# Patient Record
Sex: MALE | Race: WHITE | Employment: OTHER | URBAN - METROPOLITAN AREA
[De-identification: names, ages, dates, MRNs, and addresses within clinical notes are randomized per-mention and may not be internally consistent; named-entity substitution may affect disease eponyms.]

---

## 2021-01-21 ENCOUNTER — APPOINTMENT (OUTPATIENT)
Dept: CT IMAGING | Facility: CLINIC | Age: 81
End: 2021-01-21
Attending: EMERGENCY MEDICINE
Payer: MEDICARE

## 2021-01-21 ENCOUNTER — HOSPITAL ENCOUNTER (EMERGENCY)
Facility: CLINIC | Age: 81
Discharge: ANOTHER HOSPITAL | End: 2021-01-21
Attending: EMERGENCY MEDICINE | Admitting: EMERGENCY MEDICINE
Payer: MEDICARE

## 2021-01-21 ENCOUNTER — HOSPITAL ENCOUNTER (INPATIENT)
Facility: CLINIC | Age: 81
LOS: 2 days | Discharge: HOME | DRG: 065 | End: 2021-01-23
Attending: INTERNAL MEDICINE | Admitting: HOSPITALIST
Payer: MEDICARE

## 2021-01-21 VITALS
OXYGEN SATURATION: 98 % | WEIGHT: 170 LBS | HEART RATE: 70 BPM | SYSTOLIC BLOOD PRESSURE: 132 MMHG | BODY MASS INDEX: 25.76 KG/M2 | TEMPERATURE: 97.5 F | HEIGHT: 68 IN | RESPIRATION RATE: 15 BRPM | DIASTOLIC BLOOD PRESSURE: 70 MMHG

## 2021-01-21 DIAGNOSIS — I61.9 INTRAPARENCHYMAL HEMORRHAGE OF BRAIN (H): ICD-10-CM

## 2021-01-21 DIAGNOSIS — I10 BENIGN ESSENTIAL HYPERTENSION: Primary | ICD-10-CM

## 2021-01-21 DIAGNOSIS — I61.0 NONTRAUMATIC SUBCORTICAL HEMORRHAGE OF LEFT CEREBRAL HEMISPHERE (H): ICD-10-CM

## 2021-01-21 PROBLEM — I63.9 STROKE (H): Status: ACTIVE | Noted: 2021-01-21

## 2021-01-21 LAB
ANION GAP SERPL CALCULATED.3IONS-SCNC: 5 MMOL/L (ref 3–14)
APTT PPP: 24 SEC (ref 22–37)
BASOPHILS # BLD AUTO: 0 10E9/L (ref 0–0.2)
BASOPHILS NFR BLD AUTO: 0.5 %
BUN SERPL-MCNC: 16 MG/DL (ref 7–30)
CALCIUM SERPL-MCNC: 9.1 MG/DL (ref 8.5–10.1)
CHLORIDE SERPL-SCNC: 106 MMOL/L (ref 94–109)
CO2 SERPL-SCNC: 25 MMOL/L (ref 20–32)
CREAT SERPL-MCNC: 1.07 MG/DL (ref 0.66–1.25)
DIFFERENTIAL METHOD BLD: NORMAL
EOSINOPHIL # BLD AUTO: 0.1 10E9/L (ref 0–0.7)
EOSINOPHIL NFR BLD AUTO: 1.1 %
ERYTHROCYTE [DISTWIDTH] IN BLOOD BY AUTOMATED COUNT: 13.5 % (ref 10–15)
GFR SERPL CREATININE-BSD FRML MDRD: 64 ML/MIN/{1.73_M2}
GLUCOSE SERPL-MCNC: 92 MG/DL (ref 70–99)
HCT VFR BLD AUTO: 45.2 % (ref 40–53)
HGB BLD-MCNC: 15.1 G/DL (ref 13.3–17.7)
IMM GRANULOCYTES # BLD: 0 10E9/L (ref 0–0.4)
IMM GRANULOCYTES NFR BLD: 0.4 %
INR PPP: 1.04 (ref 0.86–1.14)
LABORATORY COMMENT REPORT: NORMAL
LYMPHOCYTES # BLD AUTO: 1 10E9/L (ref 0.8–5.3)
LYMPHOCYTES NFR BLD AUTO: 17.5 %
MCH RBC QN AUTO: 31.5 PG (ref 26.5–33)
MCHC RBC AUTO-ENTMCNC: 33.4 G/DL (ref 31.5–36.5)
MCV RBC AUTO: 94 FL (ref 78–100)
MONOCYTES # BLD AUTO: 0.4 10E9/L (ref 0–1.3)
MONOCYTES NFR BLD AUTO: 7.4 %
NEUTROPHILS # BLD AUTO: 4 10E9/L (ref 1.6–8.3)
NEUTROPHILS NFR BLD AUTO: 73.1 %
NRBC # BLD AUTO: 0 10*3/UL
NRBC BLD AUTO-RTO: 0 /100
PLATELET # BLD AUTO: 194 10E9/L (ref 150–450)
POTASSIUM SERPL-SCNC: 3.9 MMOL/L (ref 3.4–5.3)
RBC # BLD AUTO: 4.8 10E12/L (ref 4.4–5.9)
SARS-COV-2 RNA RESP QL NAA+PROBE: NEGATIVE
SODIUM SERPL-SCNC: 136 MMOL/L (ref 133–144)
SPECIMEN SOURCE: NORMAL
TROPONIN I SERPL-MCNC: <0.015 UG/L (ref 0–0.04)
WBC # BLD AUTO: 5.5 10E9/L (ref 4–11)

## 2021-01-21 PROCEDURE — 120N000001 HC R&B MED SURG/OB

## 2021-01-21 PROCEDURE — 84484 ASSAY OF TROPONIN QUANT: CPT | Performed by: EMERGENCY MEDICINE

## 2021-01-21 PROCEDURE — 80048 BASIC METABOLIC PNL TOTAL CA: CPT | Performed by: EMERGENCY MEDICINE

## 2021-01-21 PROCEDURE — 85730 THROMBOPLASTIN TIME PARTIAL: CPT | Performed by: EMERGENCY MEDICINE

## 2021-01-21 PROCEDURE — 96376 TX/PRO/DX INJ SAME DRUG ADON: CPT | Mod: 59

## 2021-01-21 PROCEDURE — 99285 EMERGENCY DEPT VISIT HI MDM: CPT | Mod: 25

## 2021-01-21 PROCEDURE — 99223 1ST HOSP IP/OBS HIGH 75: CPT | Mod: AI | Performed by: HOSPITALIST

## 2021-01-21 PROCEDURE — 85025 COMPLETE CBC W/AUTO DIFF WBC: CPT | Performed by: EMERGENCY MEDICINE

## 2021-01-21 PROCEDURE — 250N000009 HC RX 250: Performed by: EMERGENCY MEDICINE

## 2021-01-21 PROCEDURE — 70496 CT ANGIOGRAPHY HEAD: CPT

## 2021-01-21 PROCEDURE — C9803 HOPD COVID-19 SPEC COLLECT: HCPCS

## 2021-01-21 PROCEDURE — 87635 SARS-COV-2 COVID-19 AMP PRB: CPT | Performed by: EMERGENCY MEDICINE

## 2021-01-21 PROCEDURE — 70450 CT HEAD/BRAIN W/O DYE: CPT

## 2021-01-21 PROCEDURE — 85610 PROTHROMBIN TIME: CPT | Performed by: EMERGENCY MEDICINE

## 2021-01-21 PROCEDURE — 96374 THER/PROPH/DIAG INJ IV PUSH: CPT | Mod: 59

## 2021-01-21 PROCEDURE — 250N000011 HC RX IP 250 OP 636: Performed by: EMERGENCY MEDICINE

## 2021-01-21 PROCEDURE — 0042T CT HEAD PERFUSION WITH CONTRAST: CPT | Mod: MG

## 2021-01-21 PROCEDURE — 93005 ELECTROCARDIOGRAM TRACING: CPT

## 2021-01-21 RX ORDER — BISACODYL 10 MG
10 SUPPOSITORY, RECTAL RECTAL DAILY PRN
Status: DISCONTINUED | OUTPATIENT
Start: 2021-01-21 | End: 2021-01-23 | Stop reason: HOSPADM

## 2021-01-21 RX ORDER — ACETAMINOPHEN 325 MG/1
650 TABLET ORAL EVERY 4 HOURS PRN
Status: DISCONTINUED | OUTPATIENT
Start: 2021-01-21 | End: 2021-01-23 | Stop reason: HOSPADM

## 2021-01-21 RX ORDER — AMOXICILLIN 250 MG
2 CAPSULE ORAL 2 TIMES DAILY PRN
Status: DISCONTINUED | OUTPATIENT
Start: 2021-01-21 | End: 2021-01-23 | Stop reason: HOSPADM

## 2021-01-21 RX ORDER — LIDOCAINE 40 MG/G
CREAM TOPICAL
Status: DISCONTINUED | OUTPATIENT
Start: 2021-01-21 | End: 2021-01-23 | Stop reason: HOSPADM

## 2021-01-21 RX ORDER — IOPAMIDOL 755 MG/ML
500 INJECTION, SOLUTION INTRAVASCULAR ONCE
Status: COMPLETED | OUTPATIENT
Start: 2021-01-21 | End: 2021-01-21

## 2021-01-21 RX ORDER — AMOXICILLIN 250 MG
1 CAPSULE ORAL 2 TIMES DAILY PRN
Status: DISCONTINUED | OUTPATIENT
Start: 2021-01-21 | End: 2021-01-23 | Stop reason: HOSPADM

## 2021-01-21 RX ORDER — MULTIVIT-MIN/IRON/FOLIC ACID/K 18-600-40
25 CAPSULE ORAL DAILY
COMMUNITY

## 2021-01-21 RX ORDER — HYDRALAZINE HYDROCHLORIDE 20 MG/ML
10-20 INJECTION INTRAMUSCULAR; INTRAVENOUS
Status: DISCONTINUED | OUTPATIENT
Start: 2021-01-21 | End: 2021-01-23 | Stop reason: HOSPADM

## 2021-01-21 RX ORDER — POLYETHYLENE GLYCOL 3350 17 G/17G
17 POWDER, FOR SOLUTION ORAL DAILY PRN
Status: DISCONTINUED | OUTPATIENT
Start: 2021-01-21 | End: 2021-01-23 | Stop reason: HOSPADM

## 2021-01-21 RX ORDER — LABETALOL HYDROCHLORIDE 5 MG/ML
40 INJECTION, SOLUTION INTRAVENOUS ONCE
Status: COMPLETED | OUTPATIENT
Start: 2021-01-21 | End: 2021-01-21

## 2021-01-21 RX ORDER — LANOLIN ALCOHOL/MO/W.PET/CERES
3 CREAM (GRAM) TOPICAL
Status: DISCONTINUED | OUTPATIENT
Start: 2021-01-21 | End: 2021-01-23 | Stop reason: HOSPADM

## 2021-01-21 RX ORDER — ACETAMINOPHEN 650 MG/1
650 SUPPOSITORY RECTAL EVERY 4 HOURS PRN
Status: DISCONTINUED | OUTPATIENT
Start: 2021-01-21 | End: 2021-01-23 | Stop reason: HOSPADM

## 2021-01-21 RX ORDER — LISINOPRIL 20 MG/1
10 TABLET ORAL DAILY
Status: ON HOLD | COMMUNITY
End: 2021-01-23

## 2021-01-21 RX ORDER — LABETALOL HYDROCHLORIDE 5 MG/ML
20 INJECTION, SOLUTION INTRAVENOUS ONCE
Status: COMPLETED | OUTPATIENT
Start: 2021-01-21 | End: 2021-01-21

## 2021-01-21 RX ORDER — ONDANSETRON 4 MG/1
4 TABLET, ORALLY DISINTEGRATING ORAL EVERY 6 HOURS PRN
Status: DISCONTINUED | OUTPATIENT
Start: 2021-01-21 | End: 2021-01-23 | Stop reason: HOSPADM

## 2021-01-21 RX ORDER — LABETALOL HYDROCHLORIDE 5 MG/ML
10-20 INJECTION, SOLUTION INTRAVENOUS EVERY 10 MIN PRN
Status: DISCONTINUED | OUTPATIENT
Start: 2021-01-21 | End: 2021-01-23 | Stop reason: HOSPADM

## 2021-01-21 RX ORDER — ONDANSETRON 2 MG/ML
4 INJECTION INTRAMUSCULAR; INTRAVENOUS EVERY 6 HOURS PRN
Status: DISCONTINUED | OUTPATIENT
Start: 2021-01-21 | End: 2021-01-23 | Stop reason: HOSPADM

## 2021-01-21 RX ADMIN — IOPAMIDOL 120 ML: 755 INJECTION, SOLUTION INTRAVENOUS at 17:49

## 2021-01-21 RX ADMIN — LABETALOL HYDROCHLORIDE 20 MG: 5 INJECTION, SOLUTION INTRAVENOUS at 20:15

## 2021-01-21 RX ADMIN — LABETALOL HYDROCHLORIDE 20 MG: 5 INJECTION, SOLUTION INTRAVENOUS at 21:36

## 2021-01-21 RX ADMIN — SODIUM CHLORIDE 95 ML: 9 INJECTION, SOLUTION INTRAVENOUS at 17:49

## 2021-01-21 RX ADMIN — LABETALOL HYDROCHLORIDE 20 MG: 5 INJECTION, SOLUTION INTRAVENOUS at 18:54

## 2021-01-21 ASSESSMENT — ENCOUNTER SYMPTOMS
WEAKNESS: 1
FACIAL ASYMMETRY: 0
NUMBNESS: 1
SPEECH DIFFICULTY: 0
HEADACHES: 0

## 2021-01-21 ASSESSMENT — MIFFLIN-ST. JEOR: SCORE: 1455.61

## 2021-01-21 NOTE — CONSULTS
[facility]    Stroke Telephone Note    I was called by Dr. Malgorzata Paez on 01/21/21 at 1740 regarding patient Shaheen Farrell. The patient is a 80 year old male with history of HTN who presenting to the emergency department for evaluation for new onset right upper and lower extremity weakness/numbness. 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 He recalls onset of symptoms approximately at 4:20 PM.  In the emergency department patient reports some improvement of symptoms. On exam in the emergency department patient was noted to have incoordination in RUE.  CT scan revealed an area of hyperdensity in the left basal ganglia.     Stroke Code Data  (for stroke code without tele)  Stroke code activated 01/21/21   1740   First stroke provider response 01/21/21   1742   Last known normal 01/21/21   1500   Time of discovery   (or onset of symptoms) 01/21/21   1620   Head CT read by me 01/21/21   1749   Was stroke code de-escalated?   01/21/21 1814          TPA Treatment   Not given due to active bleeding.    Endovascular Treatment  Not initiated due to absence of proximal vessel occlusion    Impression  80-year-old male who presents to the emergency department for evaluation of new onset right upper extremity and right lower extremity weakness/numbness.  CT scan revealed a presumed hypertensive hemorrhage versus hemorrhagic transformation of an ischemic infarct in the left basal ganglia    Recommendations  - BP goal < 140/90  - Repeat CT as stability scan in 6 hours  - MRI brain w/wo when able  - Transfer to [facility] for further medical management   - No AC/AP    My recommendations are based on the information provided over the phone by Shaheen WELLS Jami's in-person providers. They are not intended to replace the clinical judgment of his in-person providers. I was not requested to personally see or  "examine the patient at this time.    Alicia Gomez PA-C   Neurology  To page me or covering stroke neurology team member, click here: [facility]COM   Choose \"On Call\" tab at top, then search dropdown box for \"Neurology Adult\", select location, press Enter, then look for stroke/neuro ICU/telestroke.           "

## 2021-01-21 NOTE — ED PROVIDER NOTES
"  History   Chief Complaint:  Right-Sided Weakness      The history is provided by the patient.      Shaheen Farrell is an 80 year old male with history of hypertension who presents via EMS for evaluation of right-sided weakness. 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 During the podcast he was holding his cell phone up to his head with his right elbow flexed. After approximately 78 minutes, around 1620 (1 hour prior to arrival), his right arm started to feel weak and he had some tingling in his right fingertips. He then tried to stand up but he noticed that he had also developed new weakness and \"heaviness\" in his right leg such that he could not get up. Due to concern that his symptoms could be representative of a stroke, the patient had his wife call EMS to bring him into the ED for evaluation. By the time EMS arrived, he was able to stand and walk and his symptoms have been gradually improving over time, citing they are \"90% better\".  His blood sugar was 103.     He describes persistent tingling or numbness in the tip of his right thumb and index finger and feeling his right arm is uncoordinated compared to his baseline. He denies headache, visual disturbance, facial asymmetry, or difficulty speaking. He has otherwise been in his baseline state of health.      Review of Systems   Constitutional: Negative for activity change.   Eyes: Negative for visual disturbance.   Musculoskeletal: Positive for gait problem (now improved).   Neurological: Positive for weakness (right arm and leg) and numbness (right fingertips). Negative for facial asymmetry, speech difficulty and headaches.   All other systems reviewed and are negative.    Allergies:  No known drug allergies     Medications:  Lisinopril   Vitamin D3     Past Medical History:    Hypertension     Social History:  The patient presents to the ED via EMS.   The patient is .   The patient is a retired  " "and former marine.     Physical Exam     Patient Vitals for the past 24 hrs:   BP Temp Temp src Pulse Resp SpO2 Height Weight   01/21/21 2148 132/70 -- -- 70 15 98 % -- --   01/21/21 2115 (!) 154/83 -- -- 61 19 97 % -- --   01/21/21 2100 (!) 159/85 -- -- 62 18 97 % -- --   01/21/21 2045 (!) 155/81 -- -- 64 21 97 % -- --   01/21/21 2030 (!) 147/79 -- -- 61 20 97 % -- --   01/21/21 2015 135/75 -- -- 63 16 98 % -- --   01/21/21 2000 (!) 145/80 -- -- 64 14 97 % -- --   01/21/21 1945 (!) 143/90 -- -- 66 9 97 % -- --   01/21/21 1930 (!) 143/77 -- -- 63 20 96 % -- --   01/21/21 1915 134/67 -- -- 63 16 96 % -- --   01/21/21 1900 132/77 -- -- 63 13 96 % -- --   01/21/21 1845 (!) 147/77 -- -- 77 (!) 6 97 % -- --   01/21/21 1830 (!) 148/75 -- -- 78 14 97 % -- --   01/21/21 1815 (!) 132/92 -- -- 76 (!) 5 98 % -- --   01/21/21 1813 -- -- -- -- -- -- 1.727 m (5' 8\") 77.1 kg (170 lb)   01/21/21 1812 -- -- -- 84 15 97 % -- --   01/21/21 1811 -- -- -- 80 9 94 % -- --   01/21/21 1810 (!) 132/92 -- -- 79 16 -- -- --   01/21/21 1739 (!) 171/98 97.5  F (36.4  C) Oral -- 20 -- -- --   01/21/21 1737 -- -- -- -- -- 97 % -- --   01/21/21 1733 -- -- -- -- -- 97 % -- --   01/21/21 1732 (!) 171/98 -- -- 89 -- -- -- --       Physical Exam  General: Well-developed and well-nourished. Well appearing elderly [place] man. Cooperative.  Head:  Atraumatic.  Eyes:  Conjunctivae, lids, and sclerae are normal.  ENT:    Normal nose. Moist mucous membranes.  Neck:  Supple. Normal range of motion.  CV:  Regular rate and rhythm. Normal heart sounds with no murmurs, rubs, or gallops detected.  Resp:  No respiratory distress. Clear to auscultation bilaterally without decreased breath sounds, wheezing, rales, or rhonchi.  GI:  Soft. Non-distended. Non-tender.    MS:  Normal ROM.   Skin:  Warm. Non-diaphoretic. No pallor.  Neuro: Awake. A&Ox3.    Strength 5/5 bilateral upper and lower extremities.    No pronator drift.    Sensation intact to light " touch.    No facial droop. No dysarthria.    No aphasia.    PERRL.     No visual field deficits.    Right upper extremity dysmetria.    No dysdiadochokinesis.  Psych: Normal mood and affect. Normal speech.  Vitals reviewed.     Emergency Department Course   EKG  Indication: Weakness   Time: 17:38:20  Rate 83 bpm. TX interval 134 ms. QRS duration 76 ms. QT/QTc 368/432 ms.   Normal sinus rhythm, Normal ECG    No acute ST changes.  No prior EKG for comparison.     Imaging:  CT Head Perfusion w Contrast:  IMPRESSION: No significant perfusion abnormalities are identified as above. There is only very subtle asymmetric diminished perfusion as measured by cerebral blood volume and flow in the region of the posterior left basal ganglia corresponding to area of hyperdense intraparenchymal hemorrhage on earlier head CT. Please see above for details including calculated volumes and ratios.  Per radiology.      CTA Head Neck w Contrast:  IMPRESSION:   HEAD CT:   Hyperdense intraparenchymal hemorrhage is identified in the left lentiform nuclear region. This is described on separate report, and is present on this postcontrast CTA study particularly the reformats.     HEAD CTA:   1.  No high-grade stenosis, aneurysm, dissection or evidence for thromboembolic occlusion. Mild cavernous ICA segment narrowing and calcification on an atherosclerotic basis.     NECK CTA:   1.  No hemodynamic stenosis. Nothing for dissection or occlusion  Per radiology.      CT Head w/o Contrast:  IMPRESSION:   1.  Abnormal amorphous asymmetric area of hyperdense intraparenchymal hemorrhage is identified left basal ganglia surrounding low-density changes. This may reflect an area of hypertensive hemorrhage or hemorrhagic transformation of recent ischemic change. Discussed with Dr. Sky on 1/21/2021 6:00 PM.   2.  There is no significant mass effect in the region of intraparenchymal hemorrhage.   3.  No midline shift.   4.  Underlying chronic ischemic  change deep white matter both cerebral hemispheres noted.   5.  Additional details are noted above.   Per radiology.      Laboratory:  CBC: WNL (WBC 5.5, HGB 15.1, )   BMP: WNL (Creatinine 1.07)   INR: 1.04    Troponin I 1745: <0.015   Partial thromboplastin time: 24   Asymptomatic COVID-19 Virus by PCR: Negative       Emergency Department Course:  Reviewed:  I reviewed nursing notes, vitals, and past medical history.    Assessments:  1733: I obtained history and examined the patient as noted above.   1738: Code stroke.   1825: I updated and reassessed the patient. He is comfortable with the plan for transfer to [facility].   1832:  I updated the patient's wife, Joao, over the phone.   2000: I updated and reassessed the patient. He is feeling well.      Consults:  1745: I spoke with DEVYN Ward of the stroke neurology service, regarding patient's presentation, findings, and plan of care.     1800: I spoke with Dr. Carreon of the radiology service regarding patient's presentation, findings, and plan of care.     1807: I spoke with Dr. Carreon of the radiology service regarding patient's presentation, findings, and plan of care.      1813: I spoke with DEVYN Ward of the stroke neurology service, regarding patient's presentation, findings, and plan of care.     1822: I spoke with Dr. Tai of the hospitalist service from [facility] regarding patient's presentation, findings, and plan of care.     Interventions:  1854 Labetalol 20 mg IV   2015 Labetalol 20 mg IV   2136 Labetalol 20 mg IV     Disposition:  The patient was transferred to [facility] via EMS. Dr. Tai accepted the patient for transfer.     Impression & Plan      CMS Diagnoses: The patient has stroke symptoms:         ED Stroke specific documentation           NIHSS PDF     Patient last known well time: 1500   ED Provider first to bedside at: 1733  CT Results received at: 1800     tPA:   Not given due to active  "bleeding.    If treating with tPA: Ensure SBP<180 and DBP<105 prior to treatment with IV tPA.  Administering IV tPA after treatment with IV labetalol, hydralazine, or nicardipine is reasonable once BP control is established.    Endovascular Retrieval:  Not initiated due to absence of proximal vessel occlusion    National Institutes of Health Stroke Scale (Baseline)  Time Performed: 1733     Score    Level of consciousness: (0)   Alert, keenly responsive    LOC questions: (0)   Answers both questions correctly    LOC commands: (0)   Performs both tasks correctly    Best gaze: (0)   Normal    Visual: (0)   No visual loss    Facial palsy: (0)   Normal symmetrical movements    Motor arm (left): (0)   No drift    Motor arm (right): (0)   No drift    Motor leg (left): (0)   No drift    Motor leg (right): (0)   No drift    Limb ataxia: (1)   Present in one limb    Sensory: (0)   Normal- no sensory loss    Best language: (0)   Normal- no aphasia    Dysarthria: (0)   Normal    Extinction and inattention: (0)   No abnormality        Total Score:  1     Medical Decision Making:  Shaheen is an 80-year-old man with hypertension who, just prior to arrival, noticed weakness in his right upper and lower extremities.  He feels his symptoms have improved by \"90%\" with some persistent paresthesias to the right thumb and index finger upon arrival.  Fortunately, his exam is reassuring but does have a NIH score of 1 for right upper extremity ataxia/dysmetria.  As such, a code stroke was called and he was immediately sent for CT scan.  Unfortunately, this reveals intraparenchymal hemorrhage at the left basal ganglia without mass-effect or midline shift.  CT angiogram and CT perfusion are otherwise reassuring.  EKG is reassuring without acute ST changes or arrhythmias and troponin is undetectable.  I suspect his intraparenchymal hemorrhage is related to his hypertension although typically his values were in the 130-150s while under my care. "  Coags were normal as were basic laboratory studies.      I spoke with vascular neurology regarding this patient as he was a code stroke.  SHER Vargas, recommended systolic blood pressure less than 140 and repeat head CT in 6 hours. She recommended the patient be transferred to the neurology floor at [facility].  Shaheen did require several doses of labetalol while under my care for blood pressure values in the 140s and 150s systolic.  These were spaced apart and continuous infusion can safely be deferred until he arrives at [facility] where it can be determined if this is indicated.  I updated Shaheen and his wife, Joao, by phone and answered all their questions.  They verbalized understanding and are amenable.  He had no further concerns or complaints while under my care.  I discussed the case with Dr. Tai, hospitalist, who accepts admission to [facility] and has no further orders.    Covid-19  Shaheen Farrell was evaluated during a global COVID-19 pandemic, which necessitated consideration that the patient might be at risk for infection with the SARS-CoV-2 virus that causes COVID-19.   Applicable protocols for evaluation were followed during the patient's care.   COVID-19 was considered as part of the patient's evaluation. The plan for testing is:  a test was obtained during this visit.      Diagnosis:    ICD-10-CM    1. Intraparenchymal hemorrhage of brain (H)  I61.9          Scribe Disclosure:  I, Cesar Mitchell, am serving as a scribe at 5:33 PM on 1/21/2021 to document services personally performed by Dr. Malgorzata Paez, based on my observations and the provider's statements to me.               Malgorzata Paez MD  01/22/21 0050

## 2021-01-21 NOTE — ED TRIAGE NOTES
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 when it was time to get done pt felt like his arm was falling asleep and his felt heavy and numb, pt also had difficulty controlling right leg. pt reports right leg felt heavy. pt reports symptoms are gradually getting better but are continuing. pt able to stand and take a few steps with EMS with assistance but felt very clumsy.

## 2021-01-22 ENCOUNTER — APPOINTMENT (OUTPATIENT)
Dept: MRI IMAGING | Facility: CLINIC | Age: 81
DRG: 065 | End: 2021-01-22
Attending: NURSE PRACTITIONER
Payer: MEDICARE

## 2021-01-22 ENCOUNTER — APPOINTMENT (OUTPATIENT)
Dept: CT IMAGING | Facility: CLINIC | Age: 81
DRG: 065 | End: 2021-01-22
Attending: HOSPITALIST
Payer: MEDICARE

## 2021-01-22 ENCOUNTER — APPOINTMENT (OUTPATIENT)
Dept: GENERAL RADIOLOGY | Facility: CLINIC | Age: 81
DRG: 065 | End: 2021-01-22
Attending: NURSE PRACTITIONER
Payer: MEDICARE

## 2021-01-22 ENCOUNTER — APPOINTMENT (OUTPATIENT)
Dept: CARDIOLOGY | Facility: CLINIC | Age: 81
DRG: 065 | End: 2021-01-22
Attending: HOSPITALIST
Payer: MEDICARE

## 2021-01-22 LAB
ALBUMIN SERPL-MCNC: 3.8 G/DL (ref 3.4–5)
ALP SERPL-CCNC: 40 U/L (ref 40–150)
ALT SERPL W P-5'-P-CCNC: 21 U/L (ref 0–70)
ANION GAP SERPL CALCULATED.3IONS-SCNC: 6 MMOL/L (ref 3–14)
AST SERPL W P-5'-P-CCNC: 16 U/L (ref 0–45)
BASOPHILS # BLD AUTO: 0 10E9/L (ref 0–0.2)
BASOPHILS NFR BLD AUTO: 0.7 %
BILIRUB SERPL-MCNC: 0.8 MG/DL (ref 0.2–1.3)
BUN SERPL-MCNC: 12 MG/DL (ref 7–30)
CALCIUM SERPL-MCNC: 8.9 MG/DL (ref 8.5–10.1)
CHLORIDE SERPL-SCNC: 107 MMOL/L (ref 94–109)
CO2 SERPL-SCNC: 26 MMOL/L (ref 20–32)
CREAT SERPL-MCNC: 1.05 MG/DL (ref 0.66–1.25)
DIFFERENTIAL METHOD BLD: NORMAL
EOSINOPHIL # BLD AUTO: 0.1 10E9/L (ref 0–0.7)
EOSINOPHIL NFR BLD AUTO: 2.9 %
ERYTHROCYTE [DISTWIDTH] IN BLOOD BY AUTOMATED COUNT: 13.7 % (ref 10–15)
GFR SERPL CREATININE-BSD FRML MDRD: 67 ML/MIN/{1.73_M2}
GLUCOSE BLDC GLUCOMTR-MCNC: 100 MG/DL (ref 70–99)
GLUCOSE SERPL-MCNC: 96 MG/DL (ref 70–99)
HBA1C MFR BLD: 5.3 % (ref 0–5.6)
HCT VFR BLD AUTO: 41 % (ref 40–53)
HGB BLD-MCNC: 13.9 G/DL (ref 13.3–17.7)
IMM GRANULOCYTES # BLD: 0 10E9/L (ref 0–0.4)
IMM GRANULOCYTES NFR BLD: 0.5 %
INTERPRETATION ECG - MUSE: NORMAL
LYMPHOCYTES # BLD AUTO: 1 10E9/L (ref 0.8–5.3)
LYMPHOCYTES NFR BLD AUTO: 24.1 %
MCH RBC QN AUTO: 31.3 PG (ref 26.5–33)
MCHC RBC AUTO-ENTMCNC: 33.9 G/DL (ref 31.5–36.5)
MCV RBC AUTO: 92 FL (ref 78–100)
MONOCYTES # BLD AUTO: 0.4 10E9/L (ref 0–1.3)
MONOCYTES NFR BLD AUTO: 9.5 %
NEUTROPHILS # BLD AUTO: 2.6 10E9/L (ref 1.6–8.3)
NEUTROPHILS NFR BLD AUTO: 62.3 %
NRBC # BLD AUTO: 0 10*3/UL
NRBC BLD AUTO-RTO: 0 /100
PLATELET # BLD AUTO: 189 10E9/L (ref 150–450)
POTASSIUM SERPL-SCNC: 4.1 MMOL/L (ref 3.4–5.3)
PROT SERPL-MCNC: 7 G/DL (ref 6.8–8.8)
RBC # BLD AUTO: 4.44 10E12/L (ref 4.4–5.9)
SODIUM SERPL-SCNC: 139 MMOL/L (ref 133–144)
TROPONIN I SERPL-MCNC: <0.015 UG/L (ref 0–0.04)
WBC # BLD AUTO: 4.2 10E9/L (ref 4–11)

## 2021-01-22 PROCEDURE — A9585 GADOBUTROL INJECTION: HCPCS | Performed by: HOSPITALIST

## 2021-01-22 PROCEDURE — 999N000208 ECHOCARDIOGRAM COMPLETE

## 2021-01-22 PROCEDURE — 120N000001 HC R&B MED SURG/OB

## 2021-01-22 PROCEDURE — 85025 COMPLETE CBC W/AUTO DIFF WBC: CPT | Performed by: HOSPITALIST

## 2021-01-22 PROCEDURE — 99232 SBSQ HOSP IP/OBS MODERATE 35: CPT | Performed by: HOSPITALIST

## 2021-01-22 PROCEDURE — 80053 COMPREHEN METABOLIC PANEL: CPT | Performed by: HOSPITALIST

## 2021-01-22 PROCEDURE — 73070 X-RAY EXAM OF ELBOW: CPT | Mod: RT

## 2021-01-22 PROCEDURE — 93306 TTE W/DOPPLER COMPLETE: CPT | Mod: 26 | Performed by: INTERNAL MEDICINE

## 2021-01-22 PROCEDURE — 999N001017 HC STATISTIC GLUCOSE BY METER IP

## 2021-01-22 PROCEDURE — 70450 CT HEAD/BRAIN W/O DYE: CPT | Mod: ME

## 2021-01-22 PROCEDURE — 84484 ASSAY OF TROPONIN QUANT: CPT | Performed by: HOSPITALIST

## 2021-01-22 PROCEDURE — 250N000013 HC RX MED GY IP 250 OP 250 PS 637: Performed by: HOSPITALIST

## 2021-01-22 PROCEDURE — 99222 1ST HOSP IP/OBS MODERATE 55: CPT | Performed by: PSYCHIATRY & NEUROLOGY

## 2021-01-22 PROCEDURE — 70553 MRI BRAIN STEM W/O & W/DYE: CPT | Mod: MG

## 2021-01-22 PROCEDURE — 255N000002 HC RX 255 OP 636: Performed by: HOSPITALIST

## 2021-01-22 PROCEDURE — 36415 COLL VENOUS BLD VENIPUNCTURE: CPT | Performed by: HOSPITALIST

## 2021-01-22 PROCEDURE — 83036 HEMOGLOBIN GLYCOSYLATED A1C: CPT | Performed by: HOSPITALIST

## 2021-01-22 RX ORDER — LISINOPRIL 10 MG/1
10 TABLET ORAL DAILY
Status: DISCONTINUED | OUTPATIENT
Start: 2021-01-22 | End: 2021-01-23 | Stop reason: HOSPADM

## 2021-01-22 RX ORDER — GADOBUTROL 604.72 MG/ML
8 INJECTION INTRAVENOUS ONCE
Status: COMPLETED | OUTPATIENT
Start: 2021-01-22 | End: 2021-01-22

## 2021-01-22 RX ADMIN — LISINOPRIL 10 MG: 10 TABLET ORAL at 12:58

## 2021-01-22 RX ADMIN — GADOBUTROL 8 ML: 604.72 INJECTION INTRAVENOUS at 21:04

## 2021-01-22 SDOH — HEALTH STABILITY: MENTAL HEALTH: HOW MANY STANDARD DRINKS CONTAINING ALCOHOL DO YOU HAVE ON A TYPICAL DAY?: NOT ASKED

## 2021-01-22 SDOH — HEALTH STABILITY: MENTAL HEALTH: HOW OFTEN DO YOU HAVE 6 OR MORE DRINKS ON ONE OCCASION?: NOT ASKED

## 2021-01-22 SDOH — HEALTH STABILITY: MENTAL HEALTH: HOW OFTEN DO YOU HAVE A DRINK CONTAINING ALCOHOL?: NOT ASKED

## 2021-01-22 ASSESSMENT — ENCOUNTER SYMPTOMS: ACTIVITY CHANGE: 0

## 2021-01-22 ASSESSMENT — ACTIVITIES OF DAILY LIVING (ADL)
ADLS_ACUITY_SCORE: 17
ADLS_ACUITY_SCORE: 15

## 2021-01-22 NOTE — PLAN OF CARE
Nursing shift note  DA admission from [facility] patient here with Lt basal ganglia stroke with hemorraghic conversion.A&OX4.VSS.Neuro's intact except RUE and RLE hemiparesis and numbness in RUE last two fingers.Tele SB .Repeat head CT done thi shift with no change.Up with one and cane voided adequately.Plan to have neuro consult and MRI  And ECHO this morning    Behavior & Aggression Tool color:  Green    Pt's belongings:  Glass,cell phone,wallet,jacket in the room           Home medications:    (N/A)

## 2021-01-22 NOTE — CONSULTS
"BRIEF NUTRITION ASSESSMENT      REASON FOR ASSESSMENT:  Shaheen Farrell is a 80 year old male seen by Registered Dietitian for Admission Nutrition Risk Screen   - have you recently lost weight without trying? \"unsure\"    NUTRITION HISTORY:  Visited with pt this afternoon  Pt notes that his appetite has been good and he has not had decreased po intake  Typically has coffee in the morning while he works on his computer  Brunch - sandwich  Dinner - wine for happy hour and then a meal  Bedtime snack - shortbread cookies    Pt notes that he had shingles 2/2020 - \"the meds made me lose my appetite - lost ~15# a year ago\"    CURRENT DIET AND INTAKE:  Diet:  Regular              Pt ate well for breakfast - eggs, sausage, hash browns, fruit, coffee  \"I don't know if I will still be here tonight\"    ANTHROPOMETRICS:  Height: 5'8\"  Weight: (1/21) 77.1 kg  IBW:  70 kg  %IBW: 110%  Weight History: Pt thinks his wt is ~155# - \"that 170# is from last February\"    LABS:  Labs noted    MALNUTRITION:    Patient does not meet two of the following criteria necessary for diagnosing malnutrition.     % Weight Loss:  Weight loss does not meet criteria for malnutrition (9% wt loss past February - has been stable since)  % Intake:  No decreased intake noted  Subcutaneous Fat Loss:  None observed  Muscle Loss:  None observed  Fluid Retention:  None noted    NUTRITION INTERVENTION:  Nutrition Diagnosis:  No nutrition diagnosis at this time.    Implementation:  Nutrition Education ---> Reviewed current diet order and meal ordering process    FOLLOW UP/MONITORING:   Will re-evaluate in 7 - 10 days, or sooner, if re-consulted.          "

## 2021-01-22 NOTE — PROGRESS NOTES
[facility]    Medicine Progress Note - Hospitalist Service       Date of Admission:  1/21/2021  Assessment & Plan       Shaheen Farrell is a 80 year old male admitted on 1/21/2021.  Past medical history that is most notable for hypertension and untreated dyslipidemia, who presents with sudden right sided weakness and is found to have acute intracranial hemorrhage.          Acute intracranial hemorrhage possibly due to uncontrolled HTN  Rule out hemorrhagic transformation of acute ischemic stroke  Initial head CT showed intraparenchymal hemorrhage of left basal ganglia.  CTP with slight malperfusion in this area but otherwise normal, CTA negative for stenosis/obstruction.  Repeat head CT upon transfer stable.  EKG negative for ischemic changes, troponin negative.  A1C wnl.   - neuro check q4h  - NPO pending SLP eval  - PT/OT evals pending  - MRI brain pending  (though we note he has a history of extensive trauma to both legs with multiple surgeries and metal rods/screws placed in the 1960's; will need to discuss with Radiology if MRI is safe to do)  - Neurology consult pending  - Telemetry  - lipid panel in AM  - TTE pending  - goal SBP <140  - resume PTA lisinopril     HTN  - lisinopril as above    HLD  - not on statin as outpatient; lipid panel pending     Asymptomatic COVID-19 testing negative 1/21       Diet: Regular Diet Adult    DVT Prophylaxis: Pneumatic Compression Devices  Virk Catheter: not present  Code Status: Full Code           Disposition Plan   Expected discharge: 2 - 3 days, recommended to transitional care unit once work-up complete, cleared by neurology.  Entered: Ken Cox MD 01/22/2021, 10:41 AM       The patient's care was discussed with the Bedside Nurse and Patient.    Ken Cox MD  Hospitalist Service  [facility]  Contact information available via [facility]  Everardo/y    ______________________________________________________________________    Interval History   Reports marked improvement in right upper and lower extremity strength though notes some ongoing ataxia of these extremities.  No headache, vision changes, dysarthria, dysphagia.  No other complaints.     Data reviewed today: I reviewed all medications, new labs and imaging results over the last 24 hours. I personally reviewed no images or EKG's today.    Physical Exam   Vital Signs: Temp: 98.1  F (36.7  C) Temp src: Oral BP: 118/62 Pulse: 64   Resp: 16 SpO2: 95 % O2 Device: None (Room air)    Weight: 0 lbs 0 oz  General Appearance: Well nourished male in NAD  Respiratory: lungs CTAB, no wheezes or crackles, no tachypnea  Cardiovascular: RRR, normal s1/s2, no murmur  GI: abdomen soft, normal bowel sounds  Other: Alert and appropriate, cranial nerves grossly intact, 4+/5 RUE strength, 5/5 RLE strength, mild ataxia of RUE and RLE extremities     Data   Recent Labs   Lab 01/22/21  0803 01/21/21  1745   WBC 4.2 5.5   HGB 13.9 15.1   MCV 92 94    194   INR  --  1.04    136   POTASSIUM 4.1 3.9   CHLORIDE 107 106   CO2 26 25   BUN 12 16   CR 1.05 1.07   ANIONGAP 6 5   LUCRETIA 8.9 9.1   GLC 96 92   ALBUMIN 3.8  --    PROTTOTAL 7.0  --    BILITOTAL 0.8  --    ALKPHOS 40  --    ALT 21  --    AST 16  --    TROPI <0.015 <0.015

## 2021-01-22 NOTE — CONSULTS
"[facility]    Stroke Consult Note    Reason for Consult:  CVA    Chief Complaint: No chief complaint on file.       HPI  Shaheen Farrell is a 80 year old male with past medical history significant for HTN who presented to the [facility] ED 1/21 for evaluation of right hemibody numbness and weakness and was found to have a left basal ganglia hemorrhage. 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 At 1620, he began to notice right arm weakness. When he attempted to stand, his right leg felt \"heavy\" and he could not get up. Presenting BP was 171/98.    CT head shows a left basal ganglia hemorrhage. CTA head/neck is unremarkable. A repeat CTH 6 hours later showed stable ICH. Today he feels as though his right side strength is greatly improved but he does not have full right hand dexterity.     Stroke Evaluation summarized:  MRI/Head CT: Awaiting MRI brain  Intracranial Vascular Imaging: CTA head unremarkable  Cervical Carotid and Vertebral Artery Vascular Imaging: CTA neck unremarkable  Echocardiogram: EF 55-60%, normal LA, negative bubble  EKG/Telemetry: Sinus  LDL: Awaiting  A1c: 5.3  Troponin: 0   Other testing: Not Applicable    Impression  Left basal ganglia ICH - hypertensive hemorrhage vs hemorrhagic transformation of ischemic infarct    Recommendations  - Q4 hour neuro checks  - SBP <140  - MRI brain w/wo if able (trauma and reconstructive surgery of lower extremities)  - Awaiting LDL  - Therapies  - Avoid AP/AC    Patient Follow-up    - final recommendation pending work-up    Thank you for this consult. We will continue to follow.     KARIN Martínez, CNP  Neurology  To page me or covering stroke neurology team member, click here: AMCOM   Choose \"On Call\" tab at top, then search dropdown box for \"Neurology Adult\", select location, press Enter, then look for stroke/neuro ICU/telestroke.    _____________________________________________________    Past Medical " History   Past Medical History:   Diagnosis Date     BPH (benign prostatic hyperplasia)      Hypertension      Past Surgical History   No past surgical history on file.  Medications   Home Meds  Prior to Admission medications    Medication Sig Start Date End Date Taking? Authorizing Provider   lisinopril (ZESTRIL) 20 MG tablet Take 10 mg by mouth daily 1/2 of 20 mg   Yes Unknown, Entered By History   Vitamin D, Cholecalciferol, 25 MCG (1000 UT) TABS Take 25 mcg by mouth daily   Yes Unknown, Entered By History       Scheduled Meds    sodium chloride (PF)  3 mL Intracatheter Q8H       Infusion Meds      PRN Meds  acetaminophen, acetaminophen, bisacodyl, hydrALAZINE, labetalol, lidocaine 4%, lidocaine (buffered or not buffered), melatonin, ondansetron **OR** ondansetron, polyethylene glycol, senna-docusate **OR** senna-docusate, sodium chloride (PF)    Allergies   No Known Allergies  Family History   Family History   Problem Relation Age of Onset     Dyslipidemia Sister      Dyslipidemia Brother      Social History   Social History     Tobacco Use     Smoking status: Former Smoker   Substance Use Topics     Alcohol use: Not Currently     Drug use: Not on file       Review of Systems   The 10 point Review of Systems is negative other than noted in the HPI or here.        PHYSICAL EXAMINATION   Temp:  [97.5  F (36.4  C)-98.5  F (36.9  C)] 98.1  F (36.7  C)  Pulse:  [60-89] 64  Resp:  [5-21] 16  BP: (118-171)/(62-98) 118/62  SpO2:  [94 %-98 %] 95 %    Neurologic  Mental Status:  alert, oriented x 3, follows commands, speech clear and fluent, naming and repetition normal  Cranial Nerves:  PERRL, EOMI with normal smooth pursuit, facial movements symmetric, hearing not formally tested but intact to conversation, no dysarthria, tongue protrusion midline  Motor:  normal muscle tone and bulk, no abnormal movements, able to move all limbs spontaneously, no drift in upper or lower extremities  Reflexes:  toes down-going  Sensory:   light touch sensation intact and symmetric throughout upper and lower extremities, no extinction on double simultaneous stimulation   Coordination:  decreased Trisha in right hand compared to left  Station/Gait:  deferred    Dysphagia Screen  Per Nursing    Imaging  I personally reviewed all imaging; relevant findings per HPI.    Labs Data   CBC  Recent Labs   Lab 01/21/21  1745   WBC 5.5   RBC 4.80   HGB 15.1   HCT 45.2        Basic Metabolic Panel   Recent Labs   Lab 01/21/21  1745      POTASSIUM 3.9   CHLORIDE 106   CO2 25   BUN 16   CR 1.07   GLC 92   LUCRETIA 9.1     Liver Panel  No results for input(s): PROTTOTAL, ALBUMIN, BILITOTAL, ALKPHOS, AST, ALT, BILIDIRECT in the last 168 hours.  INR    Recent Labs   Lab Test 01/21/21  1745   INR 1.04      Lipid Profile  No lab results found.  A1C  No lab results found.  Troponin I    Recent Labs   Lab 01/21/21 1745   TROPI <0.015          Stroke Code / Stroke Consult Data Data This was a non-emergent, non-tele stroke consult.
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
## 2021-01-22 NOTE — PHARMACY-ADMISSION MEDICATION HISTORY
Pharmacy Medication History  Admission medication history interview status for the 1/21/2021  admission is complete. See EPIC admission navigator for prior to admission medications     Location of Interview: Phone  Medication history sources: Patient  Medication history source reliability: Good  Adherence assessment: Good    Significant changes made to the medication list:      In the past week, patient estimated taking medication this percent of the time: greater than 90%    Additional medication history information:       Medication reconciliation completed by provider prior to medication history? No    Time spent in this activity: 5 min      Prior to Admission medications    Medication Sig Last Dose Taking? Auth Provider   lisinopril (ZESTRIL) 20 MG tablet Take 10 mg by mouth daily 1/2 of 20 mg 1/21/2021 at Unknown time Yes Unknown, Entered By History   Vitamin D, Cholecalciferol, 25 MCG (1000 UT) TABS Take 25 mcg by mouth daily 1/21/2021 at Unknown time Yes Unknown, Entered By History

## 2021-01-23 ENCOUNTER — APPOINTMENT (OUTPATIENT)
Dept: PHYSICAL THERAPY | Facility: CLINIC | Age: 81
DRG: 065 | End: 2021-01-23
Attending: INTERNAL MEDICINE
Payer: MEDICARE

## 2021-01-23 VITALS
DIASTOLIC BLOOD PRESSURE: 94 MMHG | HEART RATE: 84 BPM | SYSTOLIC BLOOD PRESSURE: 143 MMHG | RESPIRATION RATE: 16 BRPM | OXYGEN SATURATION: 97 % | TEMPERATURE: 97.9 F

## 2021-01-23 LAB
CHOLEST SERPL-MCNC: 266 MG/DL
HDLC SERPL-MCNC: 72 MG/DL
LDLC SERPL CALC-MCNC: 170 MG/DL
NONHDLC SERPL-MCNC: 194 MG/DL
TRIGL SERPL-MCNC: 120 MG/DL

## 2021-01-23 PROCEDURE — 80061 LIPID PANEL: CPT | Performed by: HOSPITALIST

## 2021-01-23 PROCEDURE — 99239 HOSP IP/OBS DSCHRG MGMT >30: CPT | Performed by: INTERNAL MEDICINE

## 2021-01-23 PROCEDURE — 36415 COLL VENOUS BLD VENIPUNCTURE: CPT | Performed by: HOSPITALIST

## 2021-01-23 PROCEDURE — 99232 SBSQ HOSP IP/OBS MODERATE 35: CPT | Performed by: PSYCHIATRY & NEUROLOGY

## 2021-01-23 PROCEDURE — 97110 THERAPEUTIC EXERCISES: CPT | Mod: GP | Performed by: PHYSICAL THERAPIST

## 2021-01-23 PROCEDURE — 250N000013 HC RX MED GY IP 250 OP 250 PS 637: Performed by: HOSPITALIST

## 2021-01-23 PROCEDURE — 97161 PT EVAL LOW COMPLEX 20 MIN: CPT | Mod: GP | Performed by: PHYSICAL THERAPIST

## 2021-01-23 PROCEDURE — 97530 THERAPEUTIC ACTIVITIES: CPT | Mod: GP | Performed by: PHYSICAL THERAPIST

## 2021-01-23 RX ORDER — LISINOPRIL 5 MG/1
5 TABLET ORAL AT BEDTIME
Qty: 30 TABLET | Refills: 0 | Status: SHIPPED | OUTPATIENT
Start: 2021-01-23 | End: 2021-07-09

## 2021-01-23 RX ORDER — LISINOPRIL 20 MG/1
10 TABLET ORAL EVERY MORNING
COMMUNITY
Start: 2021-01-23 | End: 2021-07-09 | Stop reason: DRUGHIGH

## 2021-01-23 RX ADMIN — LISINOPRIL 10 MG: 10 TABLET ORAL at 08:32

## 2021-01-23 ASSESSMENT — ACTIVITIES OF DAILY LIVING (ADL)
ADLS_ACUITY_SCORE: 15

## 2021-01-23 NOTE — PLAN OF CARE
A and O x4. VSS. No pain. Neuro symptoms subsiding. Very mild weakness in RUE and RLE with slight ataxia in both limbs. Numbness in R thumb and first finger, improving. Up with 1, belt, cane. Ambulated in hallway. Good appetite on regular diet. Voiding without difficulty. Echo completed, awaiting MRI. Scoring green on aggression screening tool. Belongings including glasses and phone at bedside. Update message left for pt's wife.

## 2021-01-23 NOTE — PROGRESS NOTES
"   01/23/21 1141   Quick Adds   Type of Visit Initial PT Evaluation   Living Environment   Living Environment Comments Town home single level, with one step in. Here in [facility], [place] 2 story home with finished basement. Has had no difficulty with the stairs in the past.    Self-Care   Usual Activity Tolerance moderate   Current Activity Tolerance moderate   Regular Exercise Yes   Activity/Exercise Type walking   Exercise Amount/Frequency 10 mins;20 mins   Equipment Currently Used at Home cane, straight   Activity/Exercise/Self-Care Comment 8# dumbell work out dailiy, some balance and walking daily for exercise.    Disability/Function   Hearing Difficulty or Deaf yes   Describe hearing loss hearing loss on left side  (\" injury.\" Requests to stand/speak on the R)   Vision Management glasses   Fall history within last six months no   Change in Functional Status Since Onset of Current Illness/Injury yes   General Information   Onset of Illness/Injury or Date of Surgery 01/21/21   Referring Physician Malgorzata Paez MD   Patient/Family Therapy Goals Statement (PT) \"Give me a program and I'll do it!\" I'm more intresetd in dong my own than going to a place, [place]   Pertinent History of Current Problem (include personal factors and/or comorbidities that impact the POC) 79 yo male Past medical history that is most notable for hypertension and untreated dyslipidemia, who presents with sudden right sided weakness and is found to have acute intracranial hemorrhage.   Existing Precautions/Restrictions fall   Cognition   Orientation Status (Cognition) oriented x 4   Affect/Mental Status (Cognition) WNL   Follows Commands (Cognition) WNL   Pain Assessment   Patient Currently in Pain No   Integumentary/Edema   Integumentary/Edema no deficits were identifed   Posture    Posture Forward head position;Protracted shoulders   Range of Motion (ROM)   ROM Comment B LEs and UEs WFL. Pt " "withlack of PF on the L, wears a heel wedge in his shoe.  Pt also with slight R shoulder drop, reports a shoulder injury when he was 74, \"hasn't been quite the same since.\"   Strength   Strength Comments Demonstrates 5/5 at B LE MMTs for basic muscle groups in sitting.  B UEs MMTs at fingers, wrists, elbow and shoulder all 5/5.    Bed Mobility   Comment (Bed Mobility) independent   Transfers   Transfer Safety Comments independent, takes time, staes \"I always stand a minute ,to regulate my BP before I go and start walking.\"   Gait/Stairs (Locomotion)   Comment (Gait/Stairs) Ambulated with single end cane, no LOB. As fatigue set in, slight miss placement of the cane; however, therapist also on the R, pt making sure enough room for therapist. No cane placement issues when therapist walked on the L. (Pt preference for therapist on the R secondary to decreased hearing in the L).   Balance   Balance Comments Sitting balance, pt able to don and doff socks, don shoes and tie them while sitting EOB< no LOB. STanding balance, static no increase in sway. No LOB during gait or stair negotiation. Pt admits \"I'm not exactly the same, a bit apprhenesive, but I feel good.\" Also reports a PMH of BPPV. Currently no symptoms.   Sensory Examination   Sensory Perception Comments Reports a slightly \"duller\" sensation on the R hand, but intct to light touch B LEs and UEs, pt reports peripheral neropathy in the legs from previous surgeries.    Coordination   Coordination Comments Finger to nose, finger to thumb intact, rapid alt hands and feet intact. No  drift noted, pt able to hold arms up and out x 10 count, solid.  Reports feeling his R hand is still a little \"off,\" but that \"It is so much better.\"    Clinical Impression   Criteria for Skilled Therapeutic Intervention yes, treatment indicated   PT Diagnosis (PT) Impaired functional activity tolerance from baseline.   Influenced by the following impairments Generalized weakness and " "fatigue from being in bed, apprehnsive initally with mobility secondary to symptoms bringing him in.    Functional limitations due to impairments Decreased functional independence.    Clinical Presentation Stable/Uncomplicated   Clinical Presentation Rationale see MR   Clinical Decision Making (Complexity) low complexity   Therapy Frequency (PT) One time eval and treatment only   Planned Therapy Interventions (PT) balance training;home exercise program;gait training;neuromuscular re-education;patient/family education;ROM (range of motion);stair training;strengthening;stretching;transfer training   Risk & Benefits of therapy have been explained evaluation/treatment results reviewed;care plan/treatment goals reviewed;risks/benefits reviewed;current/potential barriers reviewed;participants included;participants voiced agreement with care plan;patient   PT Discharge Planning    PT Discharge Recommendation (DC Rec) home   PT Rationale for DC Rec Pt appears to be at baseline with mobility, reports he still feels \"a little off,\" but also agrees \"I'm pretty much the same.\" Reports significant improvement in R UE coordination, able to hold coffee today in the R hand, alsodemonstrated ability to don/doff socks and tennis shoes, including tying his shoes without assist.    PT Brief overview of current status  Independent with bed mobility, sit<>stand. Mod I with ambulation, use of single end cane (as per baseline) x 8 minutes up in hallway, no LOB. Negotaited 4 stairs x 2 with B rails, (as per [facility] set up), no LOB, steady, Supervision.    Total Evaluation Time   Total Evaluation Time (Minutes) 10     "

## 2021-01-23 NOTE — DISCHARGE SUMMARY
[facility]  Discharge Summary        Shaheen Farrell MRN# [id number]   YOB: 1940 Age: 80 year old     Date of Admission:  1/21/2021  Date of Discharge:  1/23/2021  Admitting Physician:  Gurwinder Roca MD  Discharge Physician: Nayla Barron MD  Discharging Service: Hospitalist     Primary Provider: [facility], [facility]  Primary Care Physician Phone Number: [phone]         Discharge Diagnoses/Problem Oriented Hospital Course (Providers):    Shaheen Farrell was admitted on 1/21/2021 by Gurwinder Roca MD and I would refer you to their history and physical.  The following problems were addressed during his hospitalization:  Assessment & Plan         Shaheen Farrell is a 80 year old male admitted on 1/21/2021.  Past medical history that is most notable for hypertension and untreated dyslipidemia, who presents with sudden right sided weakness and is found to have acute intracranial hemorrhage.           Acute intracranial hemorrhage possibly due to uncontrolled HTN  Rule out hemorrhagic transformation of acute ischemic stroke  Hypertensive urgency  Initial head CT showed intraparenchymal hemorrhage of left basal ganglia.  CTP with slight malperfusion in this area but otherwise normal, CTA negative for stenosis/obstruction.  Repeat head CT upon transfer stable.  EKG negative for ischemic changes, troponin negative.  A1C wnl.     MRI of the brain without and with contrast was done and it showed 2 small hemorrhages again noted in the left lentiform nucleus with mild surrounding edema.  No gross mass-effect.  No new hemorrhage.  Subtle chronic hemosiderin deposition overlying the right superior parietal lobule.  Again areas of diffuse hyperintensity correlate with blood products and T2 shine through.  No true disc restricted diffusion.  So no stroke was seen    Neurologist evaluated him.  They are okay with him discharging him today  Blood pressure on admission to  the ED was running in the 170s needed IV labetalol and hydralazine to control the blood pressure  Currently blood pressure is running mostly in the 120s but this morning his blood pressure was in the 140s on PTA dose of lisinopril 10 mg p.o. daily  Patient requested increase in his lisinopril dosage to prevent any recurrence of these hemorrhagic episodes  So added lisinopril 5 mg at bedtime in addition to the 10 mg every morning  Close follow-up with primary care provider with blood pressure results  Patient was recommended to monitor his blood pressure closely at home  Recheck BMP in 1 week      A TTE was done during this hospitalization; which showed    left ventricular systolic function is normal. The visual ejection fraction is  estimated at 55-60%. There is borderline concentric left ventricular  hypertrophy.  Normal left atrial size noted with borderline right atrial enlargement.  There is no atrial shunt seen. A technically-limited contrast injection    Ruled out for COVID-19 on admission    Hyperlipidemia;  Fasting LPA was checked during this hospitalization and his LDL returned elevated at 194 patient refused to Start on a statin during this hospitalization    CODE STATUS full code;    DVT prophylaxis PCD's and ambulation    Discharge disposition; home today    Discussed with patient and bedside RN today    Greater than 35 minutes were spent in discharging him today and in coordination of care    Nayla Barron MD   Page [phone](7AM-6PM)          Code Status:      Full Code        Brief Hospital Stay Summary Sent Home With Patient in AVS:               Important Results:      See below        Pending Results:        Unresulted Labs Ordered in the Past 30 Days of this Admission     No orders found from 12/22/2020 to 1/22/2021.            Discharge Instructions and Follow-Up:      Follow-up Appointments     Follow-up and recommended labs and tests       Follow up with primary care provider, [facility], within 7   days for hospital follow- up.  The following labs/tests are recommended:   recheck BMP in 1week .               Discharge Disposition:      Discharged to home        Discharge Medications:        Current Discharge Medication List      CONTINUE these medications which have CHANGED    Details   !! lisinopril (ZESTRIL) 20 MG tablet Take 0.5 tablets (10 mg) by mouth every morning 1/2 of 20 mg      !! lisinopril (ZESTRIL) 5 MG tablet Take 1 tablet (5 mg) by mouth At Bedtime  Qty: 30 tablet, Refills: 0    Associated Diagnoses: Benign essential hypertension       !! - Potential duplicate medications found. Please discuss with provider.      CONTINUE these medications which have NOT CHANGED    Details   Vitamin D, Cholecalciferol, 25 MCG (1000 UT) TABS Take 25 mcg by mouth daily               Allergies:       No Known Allergies        Consultations This Hospital Stay:      Consultation during this admission received from neurology        Condition and Physical on Discharge:      Discharge condition: Stable   Vitals: Blood pressure (!) 143/94, pulse 84, temperature 97.9  F (36.6  C), temperature source Oral, resp. rate 16, SpO2 97 %.     Constitutional:  Alert awake, not in acute distress   Lungs:  Clear to auscultation no rales rhonchi or wheezing   Cardiovascular:  Normal rate rhythm regular   Abdomen:  Soft, nontender, nondistended, bowel sounds positive   Skin:  Warm and dry   Other:          Discharge Time:      Greater than 30 minutes.        Image Results From This Hospital Stay (For Non-EPIC Providers):        Results for orders placed or performed during the hospital encounter of 01/21/21   CT Head w/o Contrast    Narrative    EXAM: CT HEAD W/O CONTRAST  LOCATION: [facility]  DATE/TIME: 1/22/2021 12:24 AM    INDICATION: Cerebral hemorrhage suspected  Headache, intracranial hemorrhage suspected  Stroke, follow up  COMPARISON: 01/21/2021.  TECHNIQUE: Routine CT Head without IV  contrast. Multiplanar reformats. Dose reduction techniques were used.    FINDINGS:  INTRACRANIAL CONTENTS: Stable intraparenchymal hemorrhage in the left lentiform nucleus with minimal surrounding edema. There is no new extra-axial fluid collection or new intracranial hemorrhage visualized. There is no evidence of a mass lesion or   midline shift. No CT evidence of acute infarct. There is scattered minimal low attenuation within the periventricular and subcortical white matter consistent with diffuse small vessel ischemic disease. The ventricular system, basal cisterns and the   cortical sulci are consistent with mild volume loss.     VISUALIZED ORBITS/SINUSES/MASTOIDS: No intraorbital abnormality. No paranasal sinus mucosal disease. No middle ear or mastoid effusion.    BONES/SOFT TISSUES: No acute abnormality.      Impression    IMPRESSION:  1.  Stable intraparenchymal hemorrhage left lentiform nucleus.  2.  No CT evidence of a new hemorrhage, midline shift or focal area suggestive of acute infarct.  3.  Stable mild age-related changes.   MR Brain w/o & w Contrast    Narrative    MRI OF THE BRAIN WITHOUT AND WITH CONTRAST  1/22/2021 9:45 PM     HISTORY:  Sudden onset right-sided weakness. ICH, evaluate for  underlying ischemia     COMPARISON: Head CT 1/22/2021.    TECHNIQUE: Axial diffusion-weighted with ADC map, T2-weighted with fat  saturation, T1-weighted and turboFLAIR and coronal T1-weighted images  of the brain were obtained without intravenous contrast.  Following  8mL Gadavist IV,  axial turboFLAIR and coronal T1-weighted images of  the brain were obtained.     FINDINGS:   INTRACRANIAL CONTENTS: 2 small acute hemorrhages in the left lentiform  nucleus are again identified. They are unchanged. Mild surrounding  edema. No gross mass effect. No other acute hemorrhage. Subtle  hemosiderin overlying the right superior parietal lobule. Areas of  diffusion hyperintensity correlate with blood products and T2  shine  through. No true restricted diffusion. Mild presumed chronic small  vessel ischemic change. Mild generalized volume loss. Normal  enhancement.    SELLA: No significant abnormality accounting for technique.    OSSEOUS STRUCTURES/SOFT TISSUES: No aggressive osseous lesion  involving the calvarium, skull base, or visualized upper cervical  spine. The major intracranial vascular flow voids are maintained.    ORBITS: No significant abnormality accounting for technique.    SINUSES/MASTOIDS: No significant paranasal sinus mucosal disease. No  significant middle ear or mastoid effusion.       Impression    IMPRESSION:  1.  2 small hemorrhages again noted in the left lentiform nucleus with  mild surrounding edema.  2.  No gross mass effect.  3.  No new hemorrhage.  4.  Subtle chronic hemosiderin deposition overlying the right superior  parietal lobule.  5.   Areas of diffusion hyperintensity correlate with blood products  and T2 shine through. No true restricted diffusion.    CAROL DASILVA MD   XR Elbow Right 2 Views    Narrative    XR ELBOW RT 2 VW 2021 1:32 PM     HISTORY: hx shrapnel, clearance for MRI      Impression    IMPRESSION: IV is in place. No other evidence of metallic radiopaque  foreign body. Elbow joint space width is within normal limits. Mild  lateral epicondyles spur.    JOSE ELIAS NORTON MD   Echocardiogram Complete    Narrative    [id number]  [id number]  [id number]  [id number]^MANPREET^RUBY^[facility]  Echocardiography Laboratory  [address] [place] [address]        Name: LAM JOHNSTON  MRN: [id number]  : 1940  Study Date: 2021 09:58 AM  Age: 80 yrs  Gender: Male  Patient Location: [facility]  Reason For Study: CVI  Ordering Physician: RUBY CASE  Referring Physician: CHALINO PANDA  Performed By: Ole Lynch     BSA: 1.9 m2  Height: 68 in  Weight: 170 lb  HR: 68  BP: 137/81  mmHg  _____________________________________________________________________________  __        Procedure  Complete Portable Bubble Echo Adult.  _____________________________________________________________________________  __        Interpretation Summary     Left ventricular systolic function is normal. The visual ejection fraction is  estimated at 55-60%. There is borderline concentric left ventricular  hypertrophy.  Normal left atrial size noted with borderline right atrial enlargement.  There is no atrial shunt seen. A technically-limited contrast injection  (Bubble Study) was performed that was probably negative for flow across the  interatrial septum.  No obvious cardiac source of emboli was seen within the limits of a  transthoracic echocardiogram. There is no comparison study available.  _____________________________________________________________________________  __        Left Ventricle  The left ventricle is normal in size. There is borderline concentric left  ventricular hypertrophy. Left ventricular systolic function is normal. The  visual ejection fraction is estimated at 55-60%. Diastolic Doppler findings  (E/E' ratio and/or other parameters) suggest left ventricular filling  pressures are indeterminate. No regional wall motion abnormalities noted.     Right Ventricle  The right ventricle is normal size. The right ventricular systolic function is  normal.     Atria  Normal left atrial size. Borderline right atrial enlargement. There is no  atrial shunt seen. A contrast injection (Bubble Study) was performed that was  negative for flow across the interatrial septum.     Mitral Valve  There is trace mitral regurgitation.        Tricuspid Valve  There is mild (1+) tricuspid regurgitation. The right ventricular systolic  pressure is approximated at 28mmHg plus the right atrial pressure. IVC  diameter and respiratory changes fall into an intermediate range suggesting an  RA pressure of 8 mmHg.     Aortic  Valve  There is trace aortic regurgitation. No hemodynamically significant valvular  aortic stenosis.     Pulmonic Valve  There is no pulmonic valvular stenosis.     Vessels  Normal size aorta.     Pericardium  The pericardium appears normal.        Rhythm  Sinus rhythm was noted.  _____________________________________________________________________________  __  MMode/2D Measurements & Calculations  IVSd: 1.2 cm     LVIDd: 4.6 cm  LVIDs: 3.0 cm  LVPWd: 0.84 cm  FS: 34.6 %  LV mass(C)d: 166.7 grams  LV mass(C)dI: 87.4 grams/m2  Ao root diam: 3.7 cm  LA dimension: 3.6 cm  asc Aorta Diam: 3.3 cm  LA/Ao: 0.98  LVOT diam: 2.0 cm  LVOT area: 3.2 cm2  LA Volume (BP): 51.2 ml  LA Volume Index (BP): 26.8 ml/m2  RWT: 0.37           Doppler Measurements & Calculations  MV E max parish: 59.7 cm/sec  MV A max parish: 63.1 cm/sec  MV E/A: 0.95  MV dec slope: 201.7 cm/sec2  MV dec time: 0.30 sec  PA acc time: 0.18 sec  TR max parish: 264.7 cm/sec  TR max P.0 mmHg  Pulm Sys Parish: 63.5 cm/sec  Pulm Perez Parish: 33.0 cm/sec  Pulm S/D: 1.9  E/E' av.6  Lateral E/e': 8.3  Medial E/e': 8.9           _____________________________________________________________________________  __           Report approved by: Mitch Pierre 2021 02:13 PM              Most Recent Lab Results In EPIC (For Non-EPIC Providers):    Most Recent 3 CBC's:  Recent Labs   Lab Test 21  0803 21  1745   WBC 4.2 5.5   HGB 13.9 15.1   MCV 92 94    194      Most Recent 3 BMP's:  Recent Labs   Lab Test 21  0803 21  1745    136   POTASSIUM 4.1 3.9   CHLORIDE 107 106   CO2 26 25   BUN 12 16   CR 1.05 1.07   ANIONGAP 6 5   LUCRETIA 8.9 9.1   GLC 96 92     Most Recent 3 Troponin's:  Recent Labs   Lab Test 21  0803 21  1745   TROPI <0.015 <0.015     Most Recent 3 INR's:  Recent Labs   Lab Test 21  1745   INR 1.04     Most Recent 2 LFT's:  Recent Labs   Lab Test 21  0803   AST 16   ALT 21   ALKPHOS 40   BILITOTAL  0.8     Most Recent Cholesterol Panel:  Recent Labs   Lab Test 01/23/21  0931   CHOL 266*   *   HDL 72   TRIG 120     Most Recent 6 Bacteria Isolates From Any Culture (See EPIC Reports for Culture Details):No lab results found.  Most Recent TSH, T4 and HgbA1c:   Recent Labs   Lab Test 01/22/21  0803   A1C 5.3

## 2021-01-23 NOTE — PLAN OF CARE
OT: Orders received. Chart reviewed.  OT not indicated due to patient is at baseline, symptoms have resolved.  Defer discharge recommendations to PT.  Will complete orders.

## 2021-01-23 NOTE — PLAN OF CARE
A&Ox4. VSS on RA. Tele: SR.  denies pain. Neuro's improving, Very mild weakness in RUE and RLE, Slow and deliberate finger to nose and  heel to crocker on R. Numbness in R first finger, improving almost back to normal per pt. report. Up with 1, belt, cane. Regular diet. Voiding without difficulty. MRI completed. PIV SL. Continue to monitor.

## 2021-01-23 NOTE — PROGRESS NOTES
"  [facility]    Stroke Progress Note    Interval Events  MRI brain demonstrates known areas of ICH, without areas of true restricted diffusion. He is feeling well today.     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 Patient declines at this time.   - A1c within goal <7.0  - Therapies  - Avoid AP/AC  - Follow-up MRI brain in 6 months, will place order    Follow-Up:  - 1-2 weeks with PCP   - Hospital follow up for stroke: schedule with Any stroke HEENA in 8-12 weeks at [facility] ([phone]). Referral placed in discharge orders: note, referral states \"neurosurgery\" but it is for stroke clinic.    Thank you for this consult. No further stroke evaluation is indicated, we will sign off. Please contact us with additional questions.       KARIN Martínez, CNP  Neurology  To page me or covering stroke neurology team member, click here: [facility]COM   Choose \"On Call\" tab at top, then search dropdown box for \"Neurology Adult\", select location, press Enter, then look for stroke/neuro ICU/telestroke.    ______________________________________________________    Medications   Home Meds  Prior to Admission medications    Medication Sig Start Date End Date Taking? Authorizing Provider   lisinopril (ZESTRIL) 20 MG tablet Take 0.5 tablets (10 mg) by mouth every morning 1/2 of " 20 mg 1/23/21  Yes Nayla Barron MD   lisinopril (ZESTRIL) 5 MG tablet Take 1 tablet (5 mg) by mouth At Bedtime 1/23/21  Yes Nayla aBrron MD   Vitamin D, Cholecalciferol, 25 MCG (1000 UT) TABS Take 25 mcg by mouth daily   Yes Unknown, Entered By History       Scheduled Meds    lisinopril  10 mg Oral Daily     sodium chloride (PF)  3 mL Intracatheter Q8H       Infusion Meds      PRN Meds  acetaminophen, acetaminophen, bisacodyl, hydrALAZINE, labetalol, lidocaine 4%, lidocaine (buffered or not buffered), melatonin, ondansetron **OR** ondansetron, polyethylene glycol, senna-docusate **OR** senna-docusate, sodium chloride (PF)       PHYSICAL EXAMINATION  Temp:  [97.5  F (36.4  C)-98.1  F (36.7  C)] 97.9  F (36.6  C)  Pulse:  [60-84] 84  Resp:  [16] 16  BP: (104-143)/(64-94) 143/94  SpO2:  [95 %-98 %] 97 %     Neurologic  Mental Status:  alert, oriented x 3, follows commands, speech clear and fluent, naming and repetition normal  Cranial Nerves:  PERRL, EOMI with normal smooth pursuit, facial movements symmetric, hearing not formally tested but intact to conversation, no dysarthria, tongue protrusion midline  Motor:  normal muscle tone and bulk, no abnormal movements, able to move all limbs spontaneously, no drift in upper or lower extremities  Reflexes:  toes down-going  Sensory:  light touch sensation intact and symmetric throughout upper and lower extremities, no extinction on double simultaneous stimulation   Coordination:  decreased Trisha in right hand compared to left  Station/Gait:  deferred    Imaging  I personally reviewed all imaging; relevant findings per HPI.     Lab Results Data   CBC  Recent Labs   Lab 01/22/21  0803 01/21/21  1745   WBC 4.2 5.5   RBC 4.44 4.80   HGB 13.9 15.1   HCT 41.0 45.2    194     Basic Metabolic Panel    Recent Labs   Lab 01/22/21  0803 01/21/21  1745    136   POTASSIUM 4.1 3.9   CHLORIDE 107 106   CO2 26 25   BUN 12 16   CR 1.05 1.07   GLC 96 92   LUCRETIA 8.9 9.1      Liver Panel  Recent Labs   Lab 01/22/21  0803   PROTTOTAL 7.0   ALBUMIN 3.8   BILITOTAL 0.8   ALKPHOS 40   AST 16   ALT 21     INR    Recent Labs   Lab Test 01/21/21  1745   INR 1.04      Lipid Profile    Recent Labs   Lab Test 01/23/21  0931   CHOL 266*   HDL 72   *   TRIG 120     A1C    Recent Labs   Lab Test 01/22/21  0803   A1C 5.3     Troponin I    Recent Labs   Lab 01/22/21  0803 01/21/21  1745   TROPI <0.015 <0.015

## 2021-01-23 NOTE — DISCHARGE INSTRUCTIONS
Your risk factors for stroke or TIA (transient ischemic attack):    Your Risk Factors Your Results Normal Ranges   High blood pressure BP Readings from Last 1 Encounters:   01/23/21 (!) 143/94    Less than 120/80   Cholesterol              Total Lab Results   Component Value Date    CHOL 266 01/23/2021      Less than 150    Triglycerides   Lab Results   Component Value Date    TRIG 120 01/23/2021    Less than 150   LDL Lab Results   Component Value Date     01/23/2021       Less than 70   HDL Lab Results   Component Value Date    HDL 72 01/23/2021            Greater than 40 (men)  Greater than 50 (women)   Diabetes Recent Labs   Lab 01/22/21  0803   GLC 96    Fasting blood glucose    Smoking/tobacco use N/A Quit smoking and tobacco   Overweight N/A Lose 1-2 pounds a week   Lack of exercise Return to walking, stationary biking, low weight lifting (under 10 pounds) but no activity that increases intracranial pressure (heavy lifting, strenuous activity) until cleared by neurology. 30 minutes moderate activity each day   Other risk factors include carotid (neck) artery disease, atrial fibrillation and stress. You may be on new medicine to treat high blood pressure, cholesterol, diabetes or atrial fibrillation.    Understanding Stroke Booklet given to patient. Please refer to booklet for further information.    Stroke warning signs and symptoms - CALL 911 right away for:  - Sudden numbness or weakness in the face, arm or leg (often on one side of the body).  - Sudden confusion or trouble understanding what is going on.  - Sudden blurred or decreased vision in one or both eyes.  - Sudden trouble speaking, loss of balance, dizziness or problems with coordination.  - Sudden, severe headache for no reason.  - Fainting or seizures.  - Symptoms may go away then come back suddenly.    You will be following up with Dr. Vanegas or another neurologist at [facility]. The office phone number is  [phone]. If you do not hear from the neurology group, please call the office to clarify the timing of your follow up CT scan and any planned tele-health or clinic visits.

## 2021-01-23 NOTE — PLAN OF CARE
A and O x4. Mild weakness in RUE and RLE, no drift. Slight ataxia RUE. NIH = 2. MRS = 1. Tele reading NSR. VSS. Up with SBA, gait belt, cane. Reviewed risk factors and signs/symptoms of stroke, including when to access medical/emergency care, with pt and wife. Reviewed discharge instructions. All belongings with pt. Pt discharged home with wife.

## 2021-03-23 ENCOUNTER — TELEPHONE (OUTPATIENT)
Dept: NEUROLOGY | Facility: CLINIC | Age: 81
End: 2021-03-23

## 2021-04-27 NOTE — PROGRESS NOTES
ESTABLISHED PATIENT NEUROLOGY NOTE    DATE OF VISIT: 4/28/2021  CLINIC LOCATION: [facility]  MRN: [id number]  PATIENT NAME: Shaheen Farrell  YOB: 1940    PCP: [facility]    REASON FOR VISIT:   Chief Complaint   Patient presents with     Follow Up     stroke      SUBJECTIVE:                                                      HISTORY OF PRESENT ILLNESS: Patient is new to me, but was previously followed by stroke neurology team.  History and prior work-up are briefly summarized below, but please see previous neurology notes for more detailed information.    Per chart review, the patient has history of hypertension, hyperlipidemia, and benign prostatic hyperplasia.  On 1/21/2021 he presented to the [facility] with sudden onset of right-sided weakness/numbness and found to have acute intracranial hemorrhage.  Was transferred to [facility] for further management.    Initial head CT from the same day demonstrated intraparenchymal hemorrhage of the left basal ganglia.  CT perfusion was unrevealing.  Head and neck CTA were negative for dissection, aneurysms, significant degree of stenosis, and occlusion.  Repeat head CT was stable.    MRI of the brain with and without contrast from 1/22/2021 demonstrated 2 small hemorrhages in the left lentiform nucleus with mild surrounding edema in addition to mild chronic small vessel ischemic disease and generalized volume loss.  All images were personally reviewed and independently interpreted.    Additional work-up included hemoglobin A1C of 5.3 and LDL of 170.  TTE was negative for intracardiac thrombus or PFO.  Was seen by inpatient stroke team who recommended tight control of blood pressure and repeat brain MRI in 6 months from the time of admission.  Initiation of statin was also advised, but the patient declined.    At the present time, the patient denies interval development of new focal neurological  "symptoms.  His blood pressure is fairly well controlled at home, but goes up when he comes to the physician's office.  He took 15 mg of lisinopril daily for a while, but eventually discontinued 5 mg that were started in the hospital.  Continues to take 10 mg daily.    On review of systems, patient endorses no additional active complaints. Medications, allergies, family and social history were also reviewed. There are no changes reported by patient.  REVIEW OF SYSTEMS:                                                    10-system review was completed. Pertinent positives are included in HPI. The remainder of ROS is negative.  EXAM:                                                    Physical Exam:   Vitals: BP (!) 161/78   Pulse 81   Ht 1.727 m (5' 8\")   Wt 74.3 kg (163 lb 12.8 oz)   SpO2 99%   BMI 24.91 kg/m    Repeat vitals: BP (!) 181/85   Pulse 83   Ht 1.727 m (5' 8\")   Wt 74.3 kg (163 lb 12.8 oz)   SpO2 97%   BMI 24.91 kg/m  .  General: pt is in NAD, cooperative.  Skin: normal turgor, moist mucous membranes, no lesions/rashes noticed.  HEENT: ATNC, white sclera, normal conjunctiva.  Respiratory: Symmetric lung excursion, no accessory respiratory muscle use.  Abdomen: Non distended.  Neurological: awake, cooperative, follows commands, no aphasia or dysarthria noted, cranial nerves II-XII: Funduscopic exam is normal bilaterally, no ptosis, extraocular motility is full, face is symmetric, hard of hearing bilaterally, tongue is midline, equally moves all extremities, strength is 5/5 bilaterally (except mild chronic weakness of the left distal leg secondary to old leg injury), deep tendon reflexes are equal (except absent left achilles reflex due to leg injury), sensation to the light touch, vibration, and pinprick is reduced in the left leg (chronic leg injury), but intact elsewhere, finger to nose and heel-knee-shin tests are intact bilaterally, walks with a cane, limps on the left leg.  ASSESSMENT AND " PLAN:                                                    Assessment: 80-year-old male patient with left basal ganglia hemorrhage presumably due to uncontrolled hypertension presents for follow-up.  He denies interval development of new focal neurological symptoms.  He reports that his blood pressure is fairly well controlled at home, but is elevated today.    I emphasized the importance of connecting with his primary care provider to address blood pressure control with long-term goal of less than 140/90 to prevent further episodes of hemorrhagic strokes.  He needs to have recommended brain MRI with and without contrast in 3 months from now.  We will help him schedule it and follow-up with me in 1 to 2 days after that.  He will contact my clinic when he knows his schedule.  The order is already in from the hospital.    Shaheen to follow up with Primary Care provider regarding elevated blood pressure.     Diagnoses:    ICD-10-CM    1. History of intracranial hemorrhage  Z86.79      Plan: At today's visit we thoroughly discussed current symptoms, prior evaluation results, symptoms and signs of stroke, needed testing, and the plan.    We discussed that keeping his blood pressure under 140/90 is recommended to prevent further bleeding episodes.  In addition, he should have brain MRI with and without contrast in 3 months from now.    Symptoms and signs of stroke were discussed.  The patient clearly understands to call 911 with any SUDDEN onset of weakness, vision loss, speech problems, numbness, or severe headache of unknown cause.    Next follow-up appointment is in the next 3 months or earlier if needed.    Total Time: 69 minutes spent on the date of the encounter doing chart review, history and exam, documentation and further activities per the note.    Pranav Hannon MD  [facility] Neurology  (Chart documentation was completed in part with Dragon voice-recognition software. Even though reviewed, some  grammatical, spelling, and word errors may remain.)

## 2021-04-28 ENCOUNTER — OFFICE VISIT (OUTPATIENT)
Dept: NEUROLOGY | Facility: CLINIC | Age: 81
End: 2021-04-28
Attending: PSYCHIATRY & NEUROLOGY
Payer: MEDICARE

## 2021-04-28 VITALS
OXYGEN SATURATION: 97 % | WEIGHT: 163.8 LBS | DIASTOLIC BLOOD PRESSURE: 85 MMHG | SYSTOLIC BLOOD PRESSURE: 181 MMHG | HEART RATE: 83 BPM | HEIGHT: 68 IN | BODY MASS INDEX: 24.83 KG/M2

## 2021-04-28 DIAGNOSIS — Z86.79 HISTORY OF INTRACRANIAL HEMORRHAGE: Primary | ICD-10-CM

## 2021-04-28 PROCEDURE — G0463 HOSPITAL OUTPT CLINIC VISIT: HCPCS

## 2021-04-28 PROCEDURE — 99215 OFFICE O/P EST HI 40 MIN: CPT | Performed by: PSYCHIATRY & NEUROLOGY

## 2021-04-28 PROCEDURE — 99417 PROLNG OP E/M EACH 15 MIN: CPT | Performed by: PSYCHIATRY & NEUROLOGY

## 2021-04-28 ASSESSMENT — MIFFLIN-ST. JEOR
SCORE: 1427.49
SCORE: 1427.49

## 2021-04-28 NOTE — LETTER
4/28/2021         RE: Shaehen Farrell  [address] [address]        Dear Colleague,    Thank you for referring your patient, Shaheen Farrell, to the [facility] [place]. Please see a copy of my visit note below.    ESTABLISHED PATIENT NEUROLOGY NOTE    DATE OF VISIT: 4/28/2021  CLINIC LOCATION: [facility]  MRN: [id number]  PATIENT NAME: Shaheen Farrell  YOB: 1940    PCP: [facility]    REASON FOR VISIT:   Chief Complaint   Patient presents with     Follow Up     stroke      SUBJECTIVE:                                                      HISTORY OF PRESENT ILLNESS: Patient is new to me, but was previously followed by stroke neurology team.  History and prior work-up are briefly summarized below, but please see previous neurology notes for more detailed information.    Per chart review, the patient has history of hypertension, hyperlipidemia, and benign prostatic hyperplasia.  On 1/21/2021 he presented to the [facility] with sudden onset of right-sided weakness/numbness and found to have acute intracranial hemorrhage.  Was transferred to [facility] for further management.    Initial head CT from the same day demonstrated intraparenchymal hemorrhage of the left basal ganglia.  CT perfusion was unrevealing.  Head and neck CTA were negative for dissection, aneurysms, significant degree of stenosis, and occlusion.  Repeat head CT was stable.    MRI of the brain with and without contrast from 1/22/2021 demonstrated 2 small hemorrhages in the left lentiform nucleus with mild surrounding edema in addition to mild chronic small vessel ischemic disease and generalized volume loss.  All images were personally reviewed and independently interpreted.    Additional work-up included hemoglobin A1C of 5.3 and LDL of 170.  TTE was negative for intracardiac thrombus or PFO.  Was seen by inpatient stroke team who  "recommended tight control of blood pressure and repeat brain MRI in 6 months from the time of admission.  Initiation of statin was also advised, but the patient declined.    At the present time, the patient denies interval development of new focal neurological symptoms.  His blood pressure is fairly well controlled at home, but goes up when he comes to the physician's office.  He took 15 mg of lisinopril daily for a while, but eventually discontinued 5 mg that were started in the hospital.  Continues to take 10 mg daily.    On review of systems, patient endorses no additional active complaints. Medications, allergies, family and social history were also reviewed. There are no changes reported by patient.  REVIEW OF SYSTEMS:                                                    10-system review was completed. Pertinent positives are included in HPI. The remainder of ROS is negative.  EXAM:                                                    Physical Exam:   Vitals: BP (!) 161/78   Pulse 81   Ht 1.727 m (5' 8\")   Wt 74.3 kg (163 lb 12.8 oz)   SpO2 99%   BMI 24.91 kg/m    Repeat vitals: BP (!) 181/85   Pulse 83   Ht 1.727 m (5' 8\")   Wt 74.3 kg (163 lb 12.8 oz)   SpO2 97%   BMI 24.91 kg/m  .  General: pt is in NAD, cooperative.  Skin: normal turgor, moist mucous membranes, no lesions/rashes noticed.  HEENT: ATNC, white sclera, normal conjunctiva.  Respiratory: Symmetric lung excursion, no accessory respiratory muscle use.  Abdomen: Non distended.  Neurological: awake, cooperative, follows commands, no aphasia or dysarthria noted, cranial nerves II-XII: Funduscopic exam is normal bilaterally, no ptosis, extraocular motility is full, face is symmetric, hard of hearing bilaterally, tongue is midline, equally moves all extremities, strength is 5/5 bilaterally (except mild chronic weakness of the left distal leg secondary to old leg injury), deep tendon reflexes are equal (except absent left achilles reflex due to leg " injury), sensation to the light touch, vibration, and pinprick is reduced in the left leg (chronic leg injury), but intact elsewhere, finger to nose and heel-knee-shin tests are intact bilaterally, walks with a cane, limps on the left leg.  ASSESSMENT AND PLAN:                                                    Assessment: 80-year-old male patient with left basal ganglia hemorrhage presumably due to uncontrolled hypertension presents for follow-up.  He denies interval development of new focal neurological symptoms.  He reports that his blood pressure is fairly well controlled at home, but is elevated today.    I emphasized the importance of connecting with his primary care provider to address blood pressure control with long-term goal of less than 140/90 to prevent further episodes of hemorrhagic strokes.  He needs to have recommended brain MRI with and without contrast in 3 months from now.  We will help him schedule it and follow-up with me in 1 to 2 days after that.  He will contact my clinic when he knows his schedule.  The order is already in from the hospital.    Shaheen to follow up with Primary Care provider regarding elevated blood pressure.     Diagnoses:    ICD-10-CM    1. History of intracranial hemorrhage  Z86.79      Plan: At today's visit we thoroughly discussed current symptoms, prior evaluation results, symptoms and signs of stroke, needed testing, and the plan.    We discussed that keeping his blood pressure under 140/90 is recommended to prevent further bleeding episodes.  In addition, he should have brain MRI with and without contrast in 3 months from now.    Symptoms and signs of stroke were discussed.  The patient clearly understands to call 911 with any SUDDEN onset of weakness, vision loss, speech problems, numbness, or severe headache of unknown cause.    Next follow-up appointment is in the next 3 months or earlier if needed.    Total Time: 69 minutes spent on the date of the encounter doing  chart review, history and exam, documentation and further activities per the note.    Pranav Hannon MD  [facility] Neurology  (Chart documentation was completed in part with Dragon voice-recognition software. Even though reviewed, some grammatical, spelling, and word errors may remain.)      Again, thank you for allowing me to participate in the care of your patient.        Sincerely,        Pranav Hannon MD

## 2021-04-28 NOTE — NURSING NOTE
"April 28, 2021 1:27 PM   Shaheen Farrell is a 80 year old male who presents for:    Chief Complaint   Patient presents with     Follow Up     stroke      Initial Vitals: BP (!) 161/78   Pulse 81   Ht 5' 8\" (1.727 m)   Wt 163 lb 12.8 oz (74.3 kg)   SpO2 99%   BMI 24.91 kg/m   Estimated body mass index is 24.91 kg/m  as calculated from the following:    Height as of this encounter: 5' 8\" (1.727 m).    Weight as of this encounter: 163 lb 12.8 oz (74.3 kg). Body surface area is 1.89 meters squared.  Data Unavailable Comment: Data Unavailable       Clinical concerns: Shaheen Farerll is here today for stroke f/u.    Garry Young MA  "

## 2021-04-28 NOTE — PATIENT INSTRUCTIONS
AFTER VISIT SUMMARY (AVS):    At today's visit we thoroughly discussed current symptoms, prior evaluation results, symptoms and signs of stroke, needed testing, and the plan.    We discussed that keeping your blood pressure under 140/90 is recommended to prevent further bleeding episodes.  In addition, you should have brain MRI with and without contrast in 3 months from now.    Symptoms and signs of stroke were discussed.  You should call 911 with any SUDDEN onset of weakness, vision loss, speech problems, numbness, or severe headache of unknown cause.    Next follow-up appointment is in the next 3 months or earlier if needed.    Please do not hesitate to call me with any questions or concerns.    Thanks.

## 2021-07-07 ENCOUNTER — HOSPITAL ENCOUNTER (OUTPATIENT)
Dept: MRI IMAGING | Facility: CLINIC | Age: 81
Discharge: HOME | End: 2021-07-07
Attending: NURSE PRACTITIONER | Admitting: NURSE PRACTITIONER
Payer: MEDICARE

## 2021-07-07 DIAGNOSIS — I61.0 NONTRAUMATIC SUBCORTICAL HEMORRHAGE OF LEFT CEREBRAL HEMISPHERE (H): ICD-10-CM

## 2021-07-07 PROCEDURE — 70551 MRI BRAIN STEM W/O DYE: CPT | Mod: MG

## 2021-07-09 ENCOUNTER — OFFICE VISIT (OUTPATIENT)
Dept: NEUROLOGY | Facility: CLINIC | Age: 81
End: 2021-07-09
Attending: PSYCHIATRY & NEUROLOGY
Payer: MEDICARE

## 2021-07-09 VITALS — HEART RATE: 94 BPM | DIASTOLIC BLOOD PRESSURE: 88 MMHG | OXYGEN SATURATION: 97 % | SYSTOLIC BLOOD PRESSURE: 165 MMHG

## 2021-07-09 DIAGNOSIS — Z86.79 HISTORY OF INTRACRANIAL HEMORRHAGE: Primary | ICD-10-CM

## 2021-07-09 PROCEDURE — 99214 OFFICE O/P EST MOD 30 MIN: CPT | Performed by: PSYCHIATRY & NEUROLOGY

## 2021-07-09 PROCEDURE — G0463 HOSPITAL OUTPT CLINIC VISIT: HCPCS

## 2021-07-09 RX ORDER — LISINOPRIL 10 MG/1
10 TABLET ORAL
COMMUNITY

## 2021-07-09 NOTE — PROGRESS NOTES
ESTABLISHED PATIENT NEUROLOGY NOTE    DATE OF VISIT: 7/9/2021  CLINIC LOCATION: [facility]  MRN: [id number]  PATIENT NAME: Shaheen Farrell  YOB: 1940    REASON FOR VISIT:   Chief Complaint   Patient presents with     Hospital F/U     stroke     SUBJECTIVE:                                                      HISTORY OF PRESENT ILLNESS: Patient is here to follow up regarding left basal ganglia hemorrhage.  I saw the patient once on 4/28/2021.  Please refer to my initial/other prior notes for further information.    Since the last visit, the patient reports no significant changes in his symptoms.  He denies interval development of new focal neurological symptoms.    Brain MRI without contrast from 7/7/2021 demonstrated expected evolution of previous hemorrhage centered in the left basal ganglia without any obvious underlying mass noting limitations by non-contrast technique.  MRI with contrast was suggested if the clinical suspicion for mass is high all the patient has history of cancer in the past.  Images were personally reviewed and independently interpreted.    On review of systems, patient endorses no additional active complaints. Medications, allergies, family and social history were also reviewed. There are no changes reported by patient.  REVIEW OF SYSTEMS:                                                    10-system review was completed. Pertinent positives are included in HPI. The remainder of ROS is negative.  EXAM:                                                    Physical Exam: Initial BP was 185/78, on recheck - Vitals: BP (!) 185/75   Pulse 94   SpO2 97% .  On third recheck it reduced to 165/88.    General: pt is in NAD, cooperative.  Skin: normal turgor, moist mucous membranes, no lesions/rashes noticed.  HEENT: ATNC, white sclera, normal conjunctiva.  Respiratory: Symmetric lung excursion, no accessory respiratory muscle use.  Abdomen: Non  distended.  Neurological: awake, cooperative, follows commands, no exam changes compared to the initial visit.  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  Images were reviewed with the patient.  It is unclear to me why contrast was not used, but no obvious underlying lesion to account for hemorrhage was seen.  The patient does not have high clinical suspicion for cancer to warrant study with contrast at this point, but it should be considered if clinical suspicion for cancer becomes high.    Shaheen to follow up with Primary Care provider regarding elevated blood pressure.     Diagnoses:    ICD-10-CM    1. History of intracranial hemorrhage  Z86.79      Plan: At today's visit we thoroughly discussed current symptoms, MRI results (improved), and the plan.  I advised the patient to come back if he develops new neurological symptoms.    He is planning to see his primary care provider for elevated blood pressure.    Next follow-up appointment is on as needed basis.    Total Time: 31 minutes spent on the date of the encounter doing chart review, history and exam, documentation and further activities per the note.    Pranav Hannon MD  [facility] Neurology  (Chart documentation was completed in part with Dragon voice-recognition software. Even though reviewed, some grammatical, spelling, and word errors may remain.)

## 2021-07-09 NOTE — LETTER
7/9/2021         RE: Shaheen Farrell  [address] [address]        Dear Colleague,    Thank you for referring your patient, Shaheen Farrell, to the [facility] [place]. Please see a copy of my visit note below.    ESTABLISHED PATIENT NEUROLOGY NOTE    DATE OF VISIT: 7/9/2021  CLINIC LOCATION: [facility]  MRN: [id number]  PATIENT NAME: Shaheen Farrell  YOB: 1940    REASON FOR VISIT:   Chief Complaint   Patient presents with     Hospital F/U     stroke     SUBJECTIVE:                                                      HISTORY OF PRESENT ILLNESS: Patient is here to follow up regarding left basal ganglia hemorrhage.  I saw the patient once on 4/28/2021.  Please refer to my initial/other prior notes for further information.    Since the last visit, the patient reports no significant changes in his symptoms.  He denies interval development of new focal neurological symptoms.    Brain MRI without contrast from 7/7/2021 demonstrated expected evolution of previous hemorrhage centered in the left basal ganglia without any obvious underlying mass noting limitations by non-contrast technique.  MRI with contrast was suggested if the clinical suspicion for mass is high all the patient has history of cancer in the past.  Images were personally reviewed and independently interpreted.    On review of systems, patient endorses no additional active complaints. Medications, allergies, family and social history were also reviewed. There are no changes reported by patient.  REVIEW OF SYSTEMS:                                                    10-system review was completed. Pertinent positives are included in HPI. The remainder of ROS is negative.  EXAM:                                                    Physical Exam: Initial BP was 185/78, on recheck - Vitals: BP (!) 185/75   Pulse 94   SpO2 97% .  On third recheck it reduced to  165/88.    General: pt is in NAD, cooperative.  Skin: normal turgor, moist mucous membranes, no lesions/rashes noticed.  HEENT: ATNC, white sclera, normal conjunctiva.  Respiratory: Symmetric lung excursion, no accessory respiratory muscle use.  Abdomen: Non distended.  Neurological: awake, cooperative, follows commands, no exam changes compared to the initial visit.  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  Images were reviewed with the patient.  It is unclear to me why contrast was not used, but no obvious underlying lesion to account for hemorrhage was seen.  The patient does not have high clinical suspicion for cancer to warrant study with contrast at this point, but it should be considered if clinical suspicion for cancer becomes high.    Shaheen to follow up with Primary Care provider regarding elevated blood pressure.     Diagnoses:    ICD-10-CM    1. History of intracranial hemorrhage  Z86.79      Plan: At today's visit we thoroughly discussed current symptoms, MRI results (improved), and the plan.  I advised the patient to come back if he develops new neurological symptoms.    He is planning to see his primary care provider for elevated blood pressure.    Next follow-up appointment is on as needed basis.    Total Time: 31 minutes spent on the date of the encounter doing chart review, history and exam, documentation and further activities per the note.    Pranav Hannon MD  [facility] Neurology  (Chart documentation was completed in part with Dragon voice-recognition software. Even though reviewed, some grammatical, spelling, and word errors may remain.)      Again, thank you for allowing me to participate in the care of your patient.         Sincerely,        Pranav Hannon MD

## 2021-07-09 NOTE — PATIENT INSTRUCTIONS
AFTER VISIT SUMMARY (AVS):    At today's visit we thoroughly discussed current symptoms, MRI results (improved), and the plan.  Please come back if you develop new neurological symptoms.    Please see your primary care provider for elevated blood pressure.    Next follow-up appointment is on as needed basis.    Please do not hesitate to call me with any questions or concerns.    Thanks.